# Patient Record
(demographics unavailable — no encounter records)

---

## 2024-12-16 NOTE — HISTORY OF PRESENT ILLNESS
[FreeTextEntry1] : This 14-year-old adolescent is seen today for evaluation of his right foot.  He was well until this past weekend when he sustained injury playing basketball.  He was seen at [facility] and sent home with a posterior splint and crutches after x-rays revealed a fracture.  He is comfortable on today's visit

## 2024-12-16 NOTE — PHYSICAL EXAM
[FreeTextEntry1] : Exam today with the splint removed reveals obvious swelling and tenderness along the lateral aspect of the mid/forefoot tender over the fifth metatarsal.  The ankle and subtalar joint move well and are not tender to palpation.  Compartments are soft neurovascular status is intact.  Review of x-rays of the right foot [facility] December 15, 2024 revealed a well aligned fracture of the base of the fifth metatarsal

## 2024-12-16 NOTE — ASSESSMENT
[FreeTextEntry1] : Impression: Fracture right fifth metatarsal.  The family has been given a prescription for a postop walking shoe.  He will be allowed motion exercises as well as progressive weightbearing as tolerated.  Obviously no gym/sports until further notice he will return in 1 month with x-rays of the right foot

## 2024-12-19 NOTE — PHYSICAL EXAM
[FreeTextEntry1] : Gait: Presents NWB RLE in a DARCO shoe. Using crutches for ambulation GENERAL: alert, cooperative, in NAD SKIN: The skin is intact, warm, pink and dry over the area examined. EYES: Normal conjunctiva, normal eyelids and pupils were equal and round. ENT: normal ears, normal nose and normal lips. CARDIOVASCULAR: brisk capillary refill, but no peripheral edema. RESPIRATORY: The patient is in no apparent respiratory distress. They're taking full deep breaths without use of accessory muscles or evidence of audible wheezes or stridor without the use of a stethoscope. Normal respiratory effort. ABDOMEN: not examined  Focused exam right foot Skin is intact and there is no breakdown or abrasion Soft tissue swelling and ecchymosis lateral aspect of the foot ROM of the foot deferred at this time due to known injury There is tenderness about the base of the 5th MT Brisk capillary refill distally NV intact

## 2024-12-19 NOTE — END OF VISIT
[FreeTextEntry3] :     Saw and examined patient; the above is an accurate documentation of my words and actions.   Carlie Pierre MD [facility] Pediatric Orthopedic Surgery    [Time Spent: ___ minutes] : I have spent [unfilled] minutes of time on the encounter which excludes teaching and separately reported services.

## 2024-12-19 NOTE — REVIEW OF SYSTEMS
[Change in Activity] : change in activity [Limping] : limping [Joint Pains] : arthralgias [Joint Swelling] : joint swelling  [Nl] : Musculoskeletal no vertigo/no ear pain/no hearing difficulty/no tinnitus

## 2024-12-19 NOTE — DATA REVIEWED
[de-identified] : XR right foot 3 views performed at outside facility 12/15/24: there is non-displaced fracture of the base of the 5th MT in acceptable alignment.

## 2024-12-19 NOTE — REASON FOR VISIT
[Initial Evaluation] : an initial evaluation [Patient] : patient [FreeTextEntry1] : right foot injury. DOI-12/15/24

## 2024-12-19 NOTE — ASSESSMENT
[FreeTextEntry1] : Jack is a 14Y male with nondisplaced fracture of the base of the 5th MT sustained 12/15/24 Today's visit included obtaining history from the parent due to the child's age, the child could not be considered a reliable historian, requiring parent to act as independent historian  Clinical findings and imaging discussed at length with mother and patient. Recommendation at this time would be CAM boot for next 1 month. He can be WBAT in the CAM boot for next 4 weeks. Crutches as needed. Rest, ice, elevation and NSAIDs as needed. Avoid gym, sports and recess. He will f/u in 1 month for repeat clinical evaluation and XR right foot. All questions answered. Family and patient verbalize understanding of the plan.   IJahaira PA-C have acted as scribe and documented the above for Dr. Pierre

## 2024-12-19 NOTE — HISTORY OF PRESENT ILLNESS
[FreeTextEntry1] : Nicola is a 14Y male who presents with his mother for evaluation of right foot injury sustained 12/15/24. He was playing basketball and landed awkwardly on his right foot injuring it. He immediately felt pain and was unable to bear weight on the RLE. He was seen at [facility] where he had XRs done which were concerning for a fracture. He was placed in a splint and referred to see peds ortho. He was seen by Dr. Aguirre on 12/16/24 and was recommended DARCO shoe for 4 weeks. He returns today for second opinion. He has been tolerating his DARCO shoe well. He has been crutches for ambulation. Denies any need for pain medication at home. Denies any radiating pain, numbness or any tingling sensation. Here for orthopedic evaluation and management.   The patient's HPI was reviewed thoroughly with patient and parent. The patient's parent has acted as an independent historian regarding the above information due to the unreliable nature of the history obtained from the patient.

## 2025-01-16 NOTE — REASON FOR VISIT
[Mother] : mother [Patient] : patient [Initial Evaluation] : an initial evaluation [FreeTextEntry1] : right foot injury. DOI-12/15/24

## 2025-01-16 NOTE — HISTORY OF PRESENT ILLNESS
[FreeTextEntry1] : Nicola is a 14Y male who presents with his mother for f/u evaluation of right foot injury sustained 12/15/24. He was playing basketball and landed awkwardly on his right foot injuring it. He immediately felt pain and was unable to bear weight on the RLE. He was seen at [facility] where he had XRs done which were concerning for a fracture. He was placed in a splint and referred to see peds ortho. He was seen by Dr. Aguirre on 12/16/24 and was recommended DARCO shoe for 4 weeks, which we agreed with. He has been tolerating his DARCO shoe well. He has not needed crutches for ambulation. Denies any need for pain medication at home. Denies any radiating pain, numbness or any tingling sensation. Here for orthopedic evaluation and management.   The patient's HPI was reviewed thoroughly with patient and parent. The patient's parent has acted as an independent historian regarding the above information due to the unreliable nature of the history obtained from the patient.

## 2025-01-16 NOTE — DATA REVIEWED
[de-identified] : XR R foot 3 views performed today 1/16/25 demonstrate probable healed fracture of the base of the 5th MT as well as possible early sever's disease without fragmentation. Skeletally immature.  XR right foot 3 views performed at outside facility 12/15/24: there is non-displaced fracture of the base of the 5th MT in acceptable alignment.

## 2025-01-16 NOTE — ASSESSMENT
[FreeTextEntry1] : Jack is a 14Y male with healed nondisplaced fracture of the base of the 5th MT sustained 12/15/24 as well as possible   asymptomatic Sever's disease Today's visit included obtaining history from the parent due to the child's age, the child could not be considered a reliable historian, requiring parent to act as independent historian  Clinical findings and imaging discussed at length with mother and patient. Recommendation at this time would be to discontinue the darco shoe he has been in for the past 4 weeks and to remain out of gym/sports for one more week then gradually return to gym/sports as tolerated by pain. We discussed he should modify activities, stretch and use ice/NSAIDs should he develop any heel pain in the future in his R foot and of course f/u as needed. All questions answered. Family and patient verbalize understanding of the plan.

## 2025-01-16 NOTE — END OF VISIT
[Time Spent: ___ minutes] : I have spent [unfilled] minutes of time on the encounter which excludes teaching and separately reported services. [FreeTextEntry3] :      Saw and examined patient; the above is an accurate documentation of my words and actions.   MD Medhat Mishra [facility] Pediatric Orthopedic Surgery      Saw and examined patient; the above is an accurate documentation of my words and actions.   MD Medhat Mishra [facility] Pediatric Orthopedic Surgery

## 2025-01-16 NOTE — PHYSICAL EXAM
[FreeTextEntry1] : Gait: Presents NWB RLE in a DARCO shoe. Using crutches for ambulation GENERAL: alert, cooperative, in NAD SKIN: The skin is intact, warm, pink and dry over the area examined. EYES: Normal conjunctiva, normal eyelids and pupils were equal and round. ENT: normal ears, normal nose and normal lips. CARDIOVASCULAR: brisk capillary refill, but no peripheral edema. RESPIRATORY: The patient is in no apparent respiratory distress. They're taking full deep breaths without use of accessory muscles or evidence of audible wheezes or stridor without the use of a stethoscope. Normal respiratory effort. ABDOMEN: not examined  Focused exam right foot NTTP over R heel or base of 5th MT Skin is intact and there is no breakdown or abrasion Resolved soft tissue swelling and ecchymosis lateral aspect of the foot ROM of the foot full SILT sp dp s s t n +TA GS EHL FHL CR<2s; toes WWP Skin intact Able to heel/toe walk and single leg hop without pain